# Patient Record
Sex: FEMALE | Race: BLACK OR AFRICAN AMERICAN | Employment: FULL TIME | URBAN - METROPOLITAN AREA
[De-identification: names, ages, dates, MRNs, and addresses within clinical notes are randomized per-mention and may not be internally consistent; named-entity substitution may affect disease eponyms.]

---

## 2019-10-28 ENCOUNTER — HOSPITAL ENCOUNTER (EMERGENCY)
Facility: HOSPITAL | Age: 49
Discharge: HOME | End: 2019-10-28
Payer: MEDICAID

## 2019-10-28 VITALS
WEIGHT: 200 LBS | BODY MASS INDEX: 30.31 KG/M2 | SYSTOLIC BLOOD PRESSURE: 135 MMHG | HEART RATE: 87 BPM | HEIGHT: 68 IN | DIASTOLIC BLOOD PRESSURE: 79 MMHG | TEMPERATURE: 98 F | OXYGEN SATURATION: 98 % | RESPIRATION RATE: 18 BRPM

## 2019-10-28 DIAGNOSIS — K13.0 MUCOCELE OF LOWER LIP: Primary | ICD-10-CM

## 2019-10-28 PROCEDURE — 99281 EMR DPT VST MAYX REQ PHY/QHP: CPT

## 2019-10-28 PROCEDURE — 99282 EMERGENCY DEPT VISIT SF MDM: CPT

## 2019-10-28 NOTE — ED PROVIDER NOTES
Patient Seen in: [facility] Emergency Department      History   Patient presents with:  Mouth Cold Sores (respiratory/integumentary)    Stated Complaint: mouth sore    HPI    CHIEF COMPLAINT: Bump on lower lip for the last 6 months     HISTORY OF SD reviewed and negative except as noted above.     Physical Exam     ED Triage Vitals [10/28/19 1744]   /79   Pulse 87   Resp 18   Temp 98.1 °F (36.7 °C)   Temp src Temporal   SpO2 98 %   O2 Device None (Room air)       Current:/79   Pulse 87   Te

## 2019-10-28 NOTE — ED INITIAL ASSESSMENT (HPI)
Patient reports she bit her lip 6 months ago, and she gradually has had a \"bump\" grow in that area over the past 6 months.  Denies any pain, states she came to the ER because \"I have this bump on my lip and it is embarrassing because I cannot put lipstic

## 2020-01-21 ENCOUNTER — OFFICE VISIT (OUTPATIENT)
Dept: SURGERY | Facility: CLINIC | Age: 50
End: 2020-01-21
Payer: COMMERCIAL

## 2020-01-21 VITALS — HEIGHT: 64.96 IN | BODY MASS INDEX: 36.15 KG/M2 | WEIGHT: 217 LBS

## 2020-01-21 DIAGNOSIS — K13.0 MUCOCELE OF LOWER LIP: Primary | ICD-10-CM

## 2020-01-21 PROCEDURE — 99202 OFFICE O/P NEW SF 15 MIN: CPT | Performed by: SURGERY

## 2020-01-21 NOTE — PROGRESS NOTES
Surgery and wash instructions verbally reviewed with the patient and written instructions were also provided. The patient understands the need to obtain medical clearance for this procedure and plans to see her PCP for this.      Informed consent for the pr

## 2020-01-21 NOTE — CONSULTS
New Patient Consultation    This is the first visit for this 44year old referred for evaluation of a lesion of her lower lip. History of Present Illness:    The patient is a 44year old female referred for evaluation of a longstanding lesion of her lowe leg swelling    Breasts:  Patient denies breast masses, pain, change in the breast skin, skin dimpling, nipple discharge, or rash    Gastrointestinal:   There is no history of difficulty or pain with swallowing, reflux symptoms, nausea, vomiting, dark/ blo is without palpable masses. The trachea is in the midline. Conjunctiva are clear, non-icteric. The left lower lip shows an area of soft fluctuance measuring proxy 1 cm in diameter with thinning of the overlying mucosa consistent with a mucocele.   Intraor

## 2020-01-21 NOTE — PROGRESS NOTES
Surgeon: Dr. Marc Robles      Tel:  [phone]    Fax: [phone]     Surgery/Procedure: Excision of left lower lip mucocele.  IV sedation- MAC, 1 hour     Hospital:  [facility]: [address] [place] [address], [place], [address] [phone]    [facility]

## (undated) NOTE — ED AVS SNAPSHOT
Ms. Veena Morton   MRN: [id number]    Department:  [facility] Emergency Department   Date of Visit:  10/28/2019           Disclosure     Insurance plans vary and the physician(s) referred by the ER may not be covered by your plan.  Please contact tell this physician (or your personal doctor if your instructions are to return to your personal doctor) about any new or lasting problems. The primary care or specialist physician will see patients referred from the [facility] Emergency Department.  Mayo Marcial